# Patient Record
Sex: MALE | Race: BLACK OR AFRICAN AMERICAN
[De-identification: names, ages, dates, MRNs, and addresses within clinical notes are randomized per-mention and may not be internally consistent; named-entity substitution may affect disease eponyms.]

---

## 2018-05-02 ENCOUNTER — HOSPITAL ENCOUNTER (OUTPATIENT)
Dept: HOSPITAL 63 - US | Age: 23
Discharge: HOME | End: 2018-05-02
Attending: NURSE PRACTITIONER
Payer: COMMERCIAL

## 2018-05-02 DIAGNOSIS — R22.2: Primary | ICD-10-CM

## 2018-05-02 PROCEDURE — 76882 US LMTD JT/FCL EVL NVASC XTR: CPT

## 2018-05-02 NOTE — RAD
History:  Palpable lump on the back for 2 years, has been growing in size.

 

Comparison:  None.

 

Findings:

 

Ultrasound imaging was performed of the left lower back in the area of 

lump.  Within the subcutaneous soft tissues, there is a large hypoechoic 

mass which measures about 5 cm in transverse dimension by about 1.8 cm in 

thickness.  Craniocaudal dimension was not delineated.  There does appear 

to be some areas of vascularity.

 

Impression:

Palpable lump corresponds to large subcutaneous mass, incompletely 

visualized.  There are some areas of internal vascularity, which would be 

atypical for lipoma.  Possibilities are atypical lipoma versus low-grade 

liposarcoma.  Recommend further evaluation with MR imaging without and 

with intravenous contrast for better characterization.

 

Electronically signed by: Walter Oneil MD (5/2/2018 4:56 PM) [place] [id number]